# Patient Record
Sex: FEMALE | Race: WHITE | HISPANIC OR LATINO | URBAN - METROPOLITAN AREA
[De-identification: names, ages, dates, MRNs, and addresses within clinical notes are randomized per-mention and may not be internally consistent; named-entity substitution may affect disease eponyms.]

---

## 2023-04-12 ENCOUNTER — WEB ENCOUNTER (OUTPATIENT)
Dept: URBAN - METROPOLITAN AREA CLINIC 7 | Facility: CLINIC | Age: 63
End: 2023-04-12

## 2023-04-18 ENCOUNTER — OFFICE VISIT (OUTPATIENT)
Dept: URBAN - METROPOLITAN AREA CLINIC 7 | Facility: CLINIC | Age: 63
End: 2023-04-18
Payer: COMMERCIAL

## 2023-04-18 ENCOUNTER — WEB ENCOUNTER (OUTPATIENT)
Dept: URBAN - METROPOLITAN AREA CLINIC 7 | Facility: CLINIC | Age: 63
End: 2023-04-18

## 2023-04-18 VITALS
HEIGHT: 60 IN | SYSTOLIC BLOOD PRESSURE: 118 MMHG | WEIGHT: 153 LBS | TEMPERATURE: 97.7 F | BODY MASS INDEX: 30.04 KG/M2 | DIASTOLIC BLOOD PRESSURE: 80 MMHG

## 2023-04-18 DIAGNOSIS — R19.7 DIARRHEA, UNSPECIFIED TYPE: ICD-10-CM

## 2023-04-18 DIAGNOSIS — R14.0 ABDOMINAL BLOATING: ICD-10-CM

## 2023-04-18 PROCEDURE — 99244 OFF/OP CNSLTJ NEW/EST MOD 40: CPT | Performed by: STUDENT IN AN ORGANIZED HEALTH CARE EDUCATION/TRAINING PROGRAM

## 2023-04-18 PROCEDURE — 99204 OFFICE O/P NEW MOD 45 MIN: CPT | Performed by: STUDENT IN AN ORGANIZED HEALTH CARE EDUCATION/TRAINING PROGRAM

## 2023-04-18 RX ORDER — METHYLPREDNISOLONE 4 MG/1
TABLET ORAL
Qty: 21 | Refills: 0 | Status: ON HOLD | COMMUNITY

## 2023-04-18 RX ORDER — LISINOPRIL 5 MG/1
TABLET ORAL
Qty: 135 TABLET | Refills: 0 | Status: ACTIVE | COMMUNITY

## 2023-04-18 RX ORDER — PRASTERONE 6.5 MG/1
UNWRAP AND INSERT ONE SUPPOSITORY VAGINALLY EVERY NIGHT AT BEDTIME INSERT VAGINAL
Qty: 28 EACH | Refills: 0 | Status: ACTIVE | COMMUNITY

## 2023-04-18 RX ORDER — SIMETHICONE 125 MG/1
1 TABLET AFTER MEALS AND AT BEDTIME AS NEEDED TABLET, CHEWABLE ORAL
OUTPATIENT
Start: 2023-04-17

## 2023-04-18 RX ORDER — ALBUTEROL SULFATE 108 UG/1
INHALE 2 PUFFS BY MOUTH FOUR TIMES DAILY AS DIIRECTED AEROSOL, METERED RESPIRATORY (INHALATION)
Qty: 6.7 GRAM | Refills: 0 | Status: ACTIVE | COMMUNITY

## 2023-04-18 RX ORDER — AZITHROMYCIN 250 MG/1
TABLET, FILM COATED ORAL
Qty: 11 TABLET | Refills: 0 | Status: ACTIVE | COMMUNITY

## 2023-04-18 NOTE — HPI-TODAY'S VISIT:
Patient has a history of chronic sinusitis, cholecystectomy in 2012 for biliary colic, HTN, post menopausal sx,  who presents for abd bloating  GI Hx: [place] and has recurrent sinusitis. Has had bloating, distension for a couple of months. Early satiety. No nausea/vomiting. Intermittent constipation (will not go 1-3 days), then diarrhea.  +Incomplete evacuation. 3rd child with vaginal delivery- tear.  Denies weight loss, fever, chills, nausea, vomiting. Denies dysphagia, reflux, UGI symptoms. Denies hematemesis, melena, hematochezia, blood per rectum.  EGD: None  Colonoscopy: 2022- [facility]- no records available.  Imaging/Studies/Procedures:

## 2023-05-13 LAB
(TTG) AB, IGA: 153.3
(TTG) AB, IGG: 7
ABSOLUTE BASOPHILS: 38
ABSOLUTE EOSINOPHILS: 163
ABSOLUTE LYMPHOCYTES: 1507
ABSOLUTE MONOCYTES: 403
ABSOLUTE NEUTROPHILS: 2688
BASOPHILS: 0.8
C-REACTIVE PROTEIN, QUANT: 7.4
EOSINOPHILS: 3.4
GLIADIN (DEAMIDATED) AB (IGA): 35.5
GLIADIN (DEAMIDATED) AB (IGG): 53.8
HEMATOCRIT: 34.9
HEMOGLOBIN: 11.6
IMMUNOGLOBULIN A: 216
LYMPHOCYTES: 31.4
MCH: 30.1
MCHC: 33.2
MCV: 90.6
MONOCYTES: 8.4
MPV: 10.2
NEUTROPHILS: 56
PLATELET COUNT: 295
RDW: 12.6
RED BLOOD CELL COUNT: 3.85
TSH: 1.11
WHITE BLOOD CELL COUNT: 4.8

## 2023-05-16 ENCOUNTER — TELEPHONE ENCOUNTER (OUTPATIENT)
Dept: URBAN - METROPOLITAN AREA CLINIC 7 | Facility: CLINIC | Age: 63
End: 2023-05-16

## 2023-05-16 LAB
CALPROTECTIN, FECAL: 11
CAMPYLOBACTER SPP. AG,EIA: (no result)
CRYPTOSPORIDIUM ANTIGEN,: (no result)
GIARDIA AG, EIA, STOOL: (no result)
HELICOBACTER PYLORI AG, EIA, STOOL: (no result)
OVA AND PARASITES, CONC AND PERM SMEAR: (no result)
PANCREATIC ELASTASE, FECAL: >500
SALMONELLA AND SHIGELLA, CULTURE: (no result)
SHIGA TOXINS, EIA W/RFL TO E.COLI O157 CULTURE: (no result)

## 2023-05-23 PROBLEM — 396331005: Status: ACTIVE | Noted: 2023-05-23

## 2023-05-24 ENCOUNTER — OFFICE VISIT (OUTPATIENT)
Dept: URBAN - METROPOLITAN AREA CLINIC 7 | Facility: CLINIC | Age: 63
End: 2023-05-24
Payer: COMMERCIAL

## 2023-05-24 VITALS
BODY MASS INDEX: 30.04 KG/M2 | TEMPERATURE: 97.7 F | SYSTOLIC BLOOD PRESSURE: 120 MMHG | WEIGHT: 153 LBS | DIASTOLIC BLOOD PRESSURE: 80 MMHG | RESPIRATION RATE: 16 BRPM | HEIGHT: 60 IN

## 2023-05-24 DIAGNOSIS — D64.9 ANEMIA, UNSPECIFIED TYPE: ICD-10-CM

## 2023-05-24 DIAGNOSIS — K90.0 CELIAC DISEASE: ICD-10-CM

## 2023-05-24 DIAGNOSIS — R93.3 ABNORMAL FINDING ON GI TRACT IMAGING: ICD-10-CM

## 2023-05-24 DIAGNOSIS — R14.0 ABDOMINAL BLOATING: ICD-10-CM

## 2023-05-24 DIAGNOSIS — Z13.21 ENCOUNTER FOR VITAMIN DEFICIENCY SCREENING: ICD-10-CM

## 2023-05-24 DIAGNOSIS — R76.8 ELEVATED ANTI-TISSUE TRANSGLUTAMINASE (TTG) IGA LEVEL: ICD-10-CM

## 2023-05-24 DIAGNOSIS — K52.9 COLITIS: ICD-10-CM

## 2023-05-24 PROCEDURE — 99214 OFFICE O/P EST MOD 30 MIN: CPT | Performed by: STUDENT IN AN ORGANIZED HEALTH CARE EDUCATION/TRAINING PROGRAM

## 2023-05-24 RX ORDER — ALBUTEROL SULFATE 108 UG/1
INHALE 2 PUFFS BY MOUTH FOUR TIMES DAILY AS DIIRECTED AEROSOL, METERED RESPIRATORY (INHALATION)
Qty: 6.7 GRAM | Refills: 0 | Status: DISCONTINUED | COMMUNITY

## 2023-05-24 RX ORDER — METHYLPREDNISOLONE 4 MG/1
TABLET ORAL
Qty: 21 | Refills: 0 | Status: DISCONTINUED | COMMUNITY

## 2023-05-24 RX ORDER — AZITHROMYCIN 250 MG/1
TABLET, FILM COATED ORAL
Qty: 11 TABLET | Refills: 0 | Status: DISCONTINUED | COMMUNITY

## 2023-05-24 RX ORDER — LISINOPRIL 5 MG/1
TABLET ORAL
Qty: 135 TABLET | Refills: 0 | Status: ACTIVE | COMMUNITY

## 2023-05-24 RX ORDER — PRASTERONE 6.5 MG/1
UNWRAP AND INSERT ONE SUPPOSITORY VAGINALLY EVERY NIGHT AT BEDTIME INSERT VAGINAL
Qty: 28 EACH | Refills: 0 | Status: ACTIVE | COMMUNITY

## 2023-05-24 RX ORDER — SIMETHICONE 125 MG/1
1 TABLET AFTER MEALS AND AT BEDTIME AS NEEDED TABLET, CHEWABLE ORAL
Status: DISCONTINUED | COMMUNITY
Start: 2023-04-17

## 2023-05-24 NOTE — HPI-TODAY'S VISIT:
Patient has a history of chronic sinusitis, cholecystectomy in 2012 for biliary colic, HTN, post menopausal sx,  celiac disease? who presents for follow up.  GI Hx: 4/2023- [place] and has recurrent sinusitis. Has had bloating, distension for a couple of months. Early satiety. No nausea/vomiting. Intermittent constipation (will not go 1-3 days), then diarrhea. +Incomplete evacuation. 3rd child with vaginal delivery- tear. 5/24/23-  ***  Daughter with celiac disease.  Mild wall thickening and enhancement of asc colon  Denies weight loss, fever, chills, nausea, vomiting. Denies dysphagia, reflux, UGI symptoms. Denies hematemesis, melena, hematochezia, blood per rectum.  EGD: None  Colonoscopy: 2022- [facility]- no records available.  Imaging/Studies/Procedures: CT enterography 5/10/23- cholecystectomy, mild prominence of CBD (0.7cm, likely post cholecystectomy normal. Mild wall thickening and enhancement of asc colon. Mild fatty liver. 5/2023- celiac test positive, Hgb 11.6. TSH/CRP normal. stool culture, O/P, elastase, calprotectin 11,  H pylori, crypto, giardia all normal.

## 2023-06-02 ENCOUNTER — OFFICE VISIT (OUTPATIENT)
Dept: URBAN - METROPOLITAN AREA CLINIC 7 | Facility: CLINIC | Age: 63
End: 2023-06-02

## 2023-06-10 ENCOUNTER — WEB ENCOUNTER (OUTPATIENT)
Dept: URBAN - METROPOLITAN AREA CLINIC 7 | Facility: CLINIC | Age: 63
End: 2023-06-10

## 2023-06-14 ENCOUNTER — CLAIMS CREATED FROM THE CLAIM WINDOW (OUTPATIENT)
Dept: URBAN - METROPOLITAN AREA SURGERY CENTER 5 | Facility: SURGERY CENTER | Age: 63
End: 2023-06-14
Payer: COMMERCIAL

## 2023-06-14 ENCOUNTER — CLAIMS CREATED FROM THE CLAIM WINDOW (OUTPATIENT)
Dept: URBAN - METROPOLITAN AREA CLINIC 4 | Facility: CLINIC | Age: 63
End: 2023-06-14
Payer: COMMERCIAL

## 2023-06-14 DIAGNOSIS — R93.3 ABNORMAL FINDINGS ON DIAGNOSTIC IMAGING OF OTHER PARTS OF DIGESTIVE TRACT: ICD-10-CM

## 2023-06-14 DIAGNOSIS — R93.3 ABNORMAL FINDING ON GI TRACT IMAGING: ICD-10-CM

## 2023-06-14 DIAGNOSIS — K64.0 FIRST DEGREE HEMORRHOIDS: ICD-10-CM

## 2023-06-14 DIAGNOSIS — K57.30 DIVERTICULOSIS OF LARGE INTESTINE WITHOUT PERFORATION OR ABSCESS WITHOUT BLEEDING: ICD-10-CM

## 2023-06-14 DIAGNOSIS — K63.89 OTHER SPECIFIED DISEASES OF INTESTINE: ICD-10-CM

## 2023-06-14 PROCEDURE — 88305 TISSUE EXAM BY PATHOLOGIST: CPT | Performed by: PATHOLOGY

## 2023-06-14 PROCEDURE — 45380 COLONOSCOPY AND BIOPSY: CPT | Performed by: STUDENT IN AN ORGANIZED HEALTH CARE EDUCATION/TRAINING PROGRAM

## 2023-06-14 PROCEDURE — 00811 ANES LWR INTST NDSC NOS: CPT | Performed by: NURSE ANESTHETIST, CERTIFIED REGISTERED

## 2023-06-14 RX ORDER — LISINOPRIL 5 MG/1
TABLET ORAL
Qty: 135 TABLET | Refills: 0 | Status: ACTIVE | COMMUNITY

## 2023-06-14 RX ORDER — PRASTERONE 6.5 MG/1
UNWRAP AND INSERT ONE SUPPOSITORY VAGINALLY EVERY NIGHT AT BEDTIME INSERT VAGINAL
Qty: 28 EACH | Refills: 0 | Status: ACTIVE | COMMUNITY

## 2023-06-15 PROBLEM — 724538004: Status: ACTIVE | Noted: 2023-06-15

## 2023-08-07 ENCOUNTER — WEB ENCOUNTER (OUTPATIENT)
Dept: URBAN - METROPOLITAN AREA CLINIC 7 | Facility: CLINIC | Age: 63
End: 2023-08-07

## 2023-08-09 ENCOUNTER — WEB ENCOUNTER (OUTPATIENT)
Dept: URBAN - METROPOLITAN AREA CLINIC 7 | Facility: CLINIC | Age: 63
End: 2023-08-09

## 2023-08-25 ENCOUNTER — OFFICE VISIT (OUTPATIENT)
Dept: URBAN - METROPOLITAN AREA CLINIC 7 | Facility: CLINIC | Age: 63
End: 2023-08-25

## 2023-10-16 ENCOUNTER — WEB ENCOUNTER (OUTPATIENT)
Dept: URBAN - METROPOLITAN AREA CLINIC 7 | Facility: CLINIC | Age: 63
End: 2023-10-16

## 2023-10-17 ENCOUNTER — TELEPHONE ENCOUNTER (OUTPATIENT)
Dept: URBAN - METROPOLITAN AREA CLINIC 7 | Facility: CLINIC | Age: 63
End: 2023-10-17

## 2023-10-17 ENCOUNTER — WEB ENCOUNTER (OUTPATIENT)
Dept: URBAN - METROPOLITAN AREA CLINIC 7 | Facility: CLINIC | Age: 63
End: 2023-10-17

## 2023-10-17 ENCOUNTER — TELEPHONE ENCOUNTER (OUTPATIENT)
Dept: URBAN - METROPOLITAN AREA CLINIC 9 | Facility: CLINIC | Age: 63
End: 2023-10-17

## 2023-10-19 ENCOUNTER — OFFICE VISIT (OUTPATIENT)
Dept: URBAN - METROPOLITAN AREA CLINIC 9 | Facility: CLINIC | Age: 63
End: 2023-10-19
Payer: COMMERCIAL

## 2023-10-19 ENCOUNTER — DASHBOARD ENCOUNTERS (OUTPATIENT)
Age: 63
End: 2023-10-19

## 2023-10-19 VITALS
BODY MASS INDEX: 28.66 KG/M2 | DIASTOLIC BLOOD PRESSURE: 70 MMHG | HEIGHT: 60 IN | SYSTOLIC BLOOD PRESSURE: 120 MMHG | WEIGHT: 146 LBS

## 2023-10-19 DIAGNOSIS — R14.0 ABDOMINAL BLOATING: ICD-10-CM

## 2023-10-19 DIAGNOSIS — K90.0 CELIAC DISEASE: ICD-10-CM

## 2023-10-19 DIAGNOSIS — R11.2 NAUSEA AND VOMITING, UNSPECIFIED VOMITING TYPE: ICD-10-CM

## 2023-10-19 DIAGNOSIS — R68.81 EARLY SATIETY: ICD-10-CM

## 2023-10-19 PROCEDURE — 99214 OFFICE O/P EST MOD 30 MIN: CPT | Performed by: STUDENT IN AN ORGANIZED HEALTH CARE EDUCATION/TRAINING PROGRAM

## 2023-10-19 RX ORDER — LISINOPRIL 5 MG/1
TABLET ORAL
Qty: 135 TABLET | Refills: 0 | Status: ACTIVE | COMMUNITY

## 2023-10-19 RX ORDER — PRASTERONE 6.5 MG/1
UNWRAP AND INSERT ONE SUPPOSITORY VAGINALLY EVERY NIGHT AT BEDTIME INSERT VAGINAL
Qty: 28 EACH | Refills: 0 | Status: ACTIVE | COMMUNITY

## 2023-10-19 NOTE — HPI-TODAY'S VISIT:
Patient has a history of chronic sinusitis, cholecystectomy in 2012 for biliary colic, HTN, post menopausal sx,  celiac disease, c section x2, who presents for follow up.  GI Hx: 4/2023- [place] and has recurrent sinusitis. Has had bloating, distension for a couple of months. Early satiety. No nausea/vomiting. Intermittent constipation (will not go 1-3 days), then diarrhea. +Incomplete evacuation. 3rd child with vaginal delivery- tear. 10/19/23- Initially small BMs. Went to [place] starting on Friday. Had acute vomiting, abd distension (mild gaseous distension). Epigastric discomfort. Since Friday has improved. No vomiting since friday. Intermittent nausea.  Of note, Daughter with celiac disease.  EGD: None  Colonoscopy: 2022- [facility]- no records available. 6/14/23- Colon and TI normal. Sigmoid divertic. IH. Asc colon bx: normal. Repeat 10 years.  Imaging/Studies/Procedures: CT enterography 5/10/23- cholecystectomy, mild prominence of CBD (0.7cm, likely post cholecystectomy normal. Mild wall thickening and enhancement of asc colon. Mild fatty liver. 5/2023- celiac test positive (TTG and gliadin positive), Hgb 11.6. TSH/CRP normal. stool culture, O/P, elastase, calprotectin 11,  H pylori, crypto, giardia all normal. 10/2/23- CMP, TSH, CBC normal.

## 2023-10-23 ENCOUNTER — CLAIMS CREATED FROM THE CLAIM WINDOW (OUTPATIENT)
Dept: URBAN - METROPOLITAN AREA CLINIC 4 | Facility: CLINIC | Age: 63
End: 2023-10-23
Payer: COMMERCIAL

## 2023-10-23 ENCOUNTER — CLAIMS CREATED FROM THE CLAIM WINDOW (OUTPATIENT)
Dept: URBAN - METROPOLITAN AREA SURGERY CENTER 9 | Facility: SURGERY CENTER | Age: 63
End: 2023-10-23
Payer: COMMERCIAL

## 2023-10-23 DIAGNOSIS — K25.7 CHRONIC GASTRIC ULCER WITHOUT HEMORRHAGE OR PERFORATION: ICD-10-CM

## 2023-10-23 DIAGNOSIS — K22.81 ESOPHAGEAL POLYP: ICD-10-CM

## 2023-10-23 DIAGNOSIS — K31.89 OTHER DISEASES OF STOMACH AND DUODENUM: ICD-10-CM

## 2023-10-23 DIAGNOSIS — K31.7 POLYP OF STOMACH AND DUODENUM: ICD-10-CM

## 2023-10-23 DIAGNOSIS — K29.70 GASTRITIS WITHOUT BLEEDING, UNSPECIFIED CHRONICITY, UNSPECIFIED GASTRITIS TYPE: ICD-10-CM

## 2023-10-23 DIAGNOSIS — K25.7 CHRONIC GASTRIC ULCER: ICD-10-CM

## 2023-10-23 DIAGNOSIS — K25.9 GASTRIC ULCER, UNSPECIFIED CHRONICITY, UNSPECIFIED WHETHER GASTRIC ULCER HEMORRHAGE OR PERFORATION PRESENT: ICD-10-CM

## 2023-10-23 DIAGNOSIS — K29.60 OTHER GASTRITIS WITHOUT BLEEDING: ICD-10-CM

## 2023-10-23 DIAGNOSIS — K21.9 GASTRO-ESOPHAGEAL REFLUX DISEASE WITHOUT ESOPHAGITIS: ICD-10-CM

## 2023-10-23 PROBLEM — 397825006: Status: ACTIVE | Noted: 2023-10-23

## 2023-10-23 PROCEDURE — 88342 IMHCHEM/IMCYTCHM 1ST ANTB: CPT | Performed by: PATHOLOGY

## 2023-10-23 PROCEDURE — 88305 TISSUE EXAM BY PATHOLOGIST: CPT | Performed by: PATHOLOGY

## 2023-10-23 PROCEDURE — 00731 ANES UPR GI NDSC PX NOS: CPT | Performed by: NURSE ANESTHETIST, CERTIFIED REGISTERED

## 2023-10-23 PROCEDURE — 88312 SPECIAL STAINS GROUP 1: CPT | Performed by: PATHOLOGY

## 2023-10-23 PROCEDURE — 43239 EGD BIOPSY SINGLE/MULTIPLE: CPT | Performed by: STUDENT IN AN ORGANIZED HEALTH CARE EDUCATION/TRAINING PROGRAM

## 2023-10-23 PROCEDURE — 88341 IMHCHEM/IMCYTCHM EA ADD ANTB: CPT | Performed by: PATHOLOGY

## 2023-10-23 PROCEDURE — 88313 SPECIAL STAINS GROUP 2: CPT | Performed by: PATHOLOGY

## 2023-10-23 RX ORDER — PRASTERONE 6.5 MG/1
UNWRAP AND INSERT ONE SUPPOSITORY VAGINALLY EVERY NIGHT AT BEDTIME INSERT VAGINAL
Qty: 28 EACH | Refills: 0 | Status: ACTIVE | COMMUNITY

## 2023-10-23 RX ORDER — LISINOPRIL 5 MG/1
TABLET ORAL
Qty: 135 TABLET | Refills: 0 | Status: ACTIVE | COMMUNITY

## 2023-10-23 RX ORDER — OMEPRAZOLE 40 MG/1
1 CAPSULE 30 MINUTES BEFORE A MEAL CAPSULE, DELAYED RELEASE ORAL ONCE A DAY
Qty: 30 | Refills: 3 | OUTPATIENT
Start: 2023-10-23

## 2023-10-23 NOTE — HPI-TODAY'S VISIT:
Patient has a history of chronic sinusitis, cholecystectomy in 2012 for biliary colic, HTN, post menopausal sx,  celiac disease, c section x2, who presents for follow up.  GI Hx: 4/2023- [place] and has recurrent sinusitis. Has had bloating, distension for a couple of months. Early satiety. No nausea/vomiting. Intermittent constipation (will not go 1-3 days), then diarrhea. +Incomplete evacuation. 3rd child with vaginal delivery- tear. 10/19/23- Initially small BMs. Went to [place] starting on Friday. Had acute vomiting, abd distension (mild gaseous distension). Epigastric discomfort. Since Friday has improved. No vomiting since friday. Intermittent nausea.  Of note, Daughter with celiac disease.  EGD: None  Colonoscopy: 2022- [facility]- no records available. 6/14/23- Colon and TI normal. Sigmoid divertic. IH. Asc colon bx: normal. Repeat 10 years.  Imaging/Studies/Procedures: CT enterography 5/10/23- cholecystectomy, mild prominence of CBD (0.7cm, likely post cholecystectomy normal. Mild wall thickening and enhancement of asc colon. Mild fatty liver. 5/2023- celiac test positive (TTG and gliadin positive), Hgb 11.6. TSH/CRP normal. stool culture, O/P, elastase, calprotectin 11,  H pylori, crypto, giardia all normal. 10/2/23- CMP, TSH, CBC normal.

## 2023-10-25 LAB
ALPHA-TOCOPHEROL: 16
BETA-GAMMA-TOCOPHEROL: 1
COPPER, SERUM: 127
FERRITIN, SERUM: 122
FOLATE (FOLIC ACID), SERUM: >24
IRON BIND.CAP.(TIBC): 289
IRON SATURATION: 22
IRON: 64
VITAMIN A: 79
VITAMIN B12: 471
VITAMIN D,25-OH,TOTAL,IA: 87
ZINC, PLASMA OR SERUM: 71

## 2023-10-27 ENCOUNTER — OFFICE VISIT (OUTPATIENT)
Dept: URBAN - METROPOLITAN AREA CLINIC 7 | Facility: CLINIC | Age: 63
End: 2023-10-27

## 2023-11-10 ENCOUNTER — TELEPHONE ENCOUNTER (OUTPATIENT)
Dept: URBAN - METROPOLITAN AREA CLINIC 7 | Facility: CLINIC | Age: 63
End: 2023-11-10

## 2023-11-30 LAB — RESULT:: (no result)

## 2024-01-22 ENCOUNTER — WEB ENCOUNTER (OUTPATIENT)
Dept: URBAN - METROPOLITAN AREA CLINIC 7 | Facility: CLINIC | Age: 64
End: 2024-01-22

## 2024-02-05 ENCOUNTER — EGD (OUTPATIENT)
Dept: URBAN - METROPOLITAN AREA SURGERY CENTER 9 | Facility: SURGERY CENTER | Age: 64
End: 2024-02-05

## 2024-02-05 ENCOUNTER — EGD (OUTPATIENT)
Dept: URBAN - METROPOLITAN AREA SURGERY CENTER 9 | Facility: SURGERY CENTER | Age: 64
End: 2024-02-05
Payer: COMMERCIAL

## 2024-02-05 DIAGNOSIS — Z87.19 PERSONAL HISTORY OF OTHER DISEASES OF THE DIGESTIVE SYSTEM: ICD-10-CM

## 2024-02-05 PROCEDURE — 43235 EGD DIAGNOSTIC BRUSH WASH: CPT | Performed by: STUDENT IN AN ORGANIZED HEALTH CARE EDUCATION/TRAINING PROGRAM

## 2024-02-05 RX ORDER — PRASTERONE 6.5 MG/1
UNWRAP AND INSERT ONE SUPPOSITORY VAGINALLY EVERY NIGHT AT BEDTIME INSERT VAGINAL
Qty: 28 EACH | Refills: 0 | Status: ACTIVE | COMMUNITY

## 2024-02-05 RX ORDER — LISINOPRIL 5 MG/1
TABLET ORAL
Qty: 135 TABLET | Refills: 0 | Status: ACTIVE | COMMUNITY

## 2024-02-05 RX ORDER — OMEPRAZOLE 40 MG/1
1 CAPSULE 30 MINUTES BEFORE A MEAL CAPSULE, DELAYED RELEASE ORAL ONCE A DAY
Qty: 30 | Refills: 3 | Status: ACTIVE | COMMUNITY
Start: 2023-10-23

## 2024-02-05 NOTE — HPI-TODAY'S VISIT:
Patient has a history of chronic sinusitis, cholecystectomy in 2012 for biliary colic, HTN, post menopausal sx,  celiac disease, c section x2, who presents for follow up.  GI Hx: 4/2023- [place] and has recurrent sinusitis. Has had bloating, distension for a couple of months. Early satiety. No nausea/vomiting. Intermittent constipation (will not go 1-3 days), then diarrhea. +Incomplete evacuation. 3rd child with vaginal delivery- tear. 10/19/23- Initially small BMs. Went to [place] starting on Friday. Had acute vomiting, abd distension (mild gaseous distension). Epigastric discomfort. Since Friday has improved. No vomiting since friday. Intermittent nausea.  Of note, Daughter with celiac disease.  EGD: 10/23/23- One small esophageal polyp at 15cm (bx; reflux changes), mild to mod gastritis (bx: chronic acitive gastiritis with changes identical to H pylori, but negative), two small fundic appearing polyps in body (bx: chronic inactiv gastritis, prior treated H pylori), two small gastric ulcers (mary III) (bx; foveolar hyperplasia, ulcer border), normal duodenum (bx: normal).  Colonoscopy: 2022- [facility]- no records available. 6/14/23- Colon and TI normal. Sigmoid divertic. IH. Asc colon bx: normal. Repeat 10 years.  Imaging/Studies/Procedures: CT enterography 5/10/23- cholecystectomy, mild prominence of CBD (0.7cm, likely post cholecystectomy normal. Mild wall thickening and enhancement of asc colon. Mild fatty liver. 5/2023- celiac test positive (TTG and gliadin positive), Hgb 11.6. TSH/CRP normal. stool culture, O/P, elastase, calprotectin 11,  H pylori, crypto, giardia all normal. 10/2/23- CMP, TSH, CBC normal.

## 2024-04-16 ENCOUNTER — APPOINTMENT (RX ONLY)
Dept: URBAN - METROPOLITAN AREA CLINIC 333 | Facility: CLINIC | Age: 64
Setting detail: DERMATOLOGY
End: 2024-04-16

## 2024-04-16 DIAGNOSIS — L57.8 OTHER SKIN CHANGES DUE TO CHRONIC EXPOSURE TO NONIONIZING RADIATION: ICD-10-CM

## 2024-04-16 DIAGNOSIS — L70.0 ACNE VULGARIS: ICD-10-CM

## 2024-04-16 DIAGNOSIS — D22 MELANOCYTIC NEVI: ICD-10-CM

## 2024-04-16 PROBLEM — D22.5 MELANOCYTIC NEVI OF TRUNK: Status: ACTIVE | Noted: 2024-04-16

## 2024-04-16 PROCEDURE — ? TREATMENT REGIMEN

## 2024-04-16 PROCEDURE — 99203 OFFICE O/P NEW LOW 30 MIN: CPT

## 2024-04-16 PROCEDURE — ? COUNSELING

## 2024-04-16 ASSESSMENT — LOCATION SIMPLE DESCRIPTION DERM
LOCATION SIMPLE: CHEST
LOCATION SIMPLE: LEFT FOREARM
LOCATION SIMPLE: RIGHT FOREARM
LOCATION SIMPLE: RIGHT FOREHEAD
LOCATION SIMPLE: UPPER BACK
LOCATION SIMPLE: RIGHT UPPER BACK

## 2024-04-16 ASSESSMENT — LOCATION DETAILED DESCRIPTION DERM
LOCATION DETAILED: UPPER STERNUM
LOCATION DETAILED: LEFT DISTAL DORSAL FOREARM
LOCATION DETAILED: SUPERIOR THORACIC SPINE
LOCATION DETAILED: RIGHT SUPERIOR MEDIAL UPPER BACK
LOCATION DETAILED: RIGHT DISTAL DORSAL FOREARM
LOCATION DETAILED: RIGHT INFERIOR MEDIAL FOREHEAD

## 2024-04-16 ASSESSMENT — LOCATION ZONE DERM
LOCATION ZONE: TRUNK
LOCATION ZONE: FACE
LOCATION ZONE: ARM

## 2024-04-16 NOTE — HPI: EVALUATION OF SKIN LESION(S)
Hpi Title: Evaluation of Skin Lesions
Additional History: Patient has some acne concerns on her face and back  as well

## 2024-04-16 NOTE — PROCEDURE: COUNSELING
Detail Level: Generalized
Winlevi Counseling:  I discussed with the patient the risks of topical clascoterone including but not limited to erythema, scaling, itching, and stinging. Patient voiced their understanding.
Tetracycline Counseling: Patient counseled regarding possible photosensitivity and increased risk for sunburn.  Patient instructed to avoid sunlight, if possible.  When exposed to sunlight, patients should wear protective clothing, sunglasses, and sunscreen.  The patient was instructed to call the office immediately if the following severe adverse effects occur:  hearing changes, easy bruising/bleeding, severe headache, or vision changes.  The patient verbalized understanding of the proper use and possible adverse effects of tetracycline.  All of the patient's questions and concerns were addressed. Patient understands to avoid pregnancy while on therapy due to potential birth defects.
Birth Control Pills Counseling: Birth Control Pill Counseling: I discussed with the patient the potential side effects of OCPs including but not limited to increased risk of stroke, heart attack, thrombophlebitis, deep venous thrombosis, hepatic adenomas, breast changes, GI upset, headaches, and depression.  The patient verbalized understanding of the proper use and possible adverse effects of OCPs. All of the patient's questions and concerns were addressed.
Isotretinoin Pregnancy And Lactation Text: This medication is Pregnancy Category X and is considered extremely dangerous during pregnancy. It is unknown if it is excreted in breast milk.
Bactrim Counseling:  I discussed with the patient the risks of sulfa antibiotics including but not limited to GI upset, allergic reaction, drug rash, diarrhea, dizziness, photosensitivity, and yeast infections.  Rarely, more serious reactions can occur including but not limited to aplastic anemia, agranulocytosis, methemoglobinemia, blood dyscrasias, liver or kidney failure, lung infiltrates or desquamative/blistering drug rashes.
Topical Retinoid Pregnancy And Lactation Text: This medication is Pregnancy Category C. It is unknown if this medication is excreted in breast milk.
Detail Level: Zone
High Dose Vitamin A Pregnancy And Lactation Text: High dose vitamin A therapy is contraindicated during pregnancy and breast feeding.
Doxycycline Pregnancy And Lactation Text: This medication is Pregnancy Category D and not consider safe during pregnancy. It is also excreted in breast milk but is considered safe for shorter treatment courses.
Tazorac Pregnancy And Lactation Text: This medication is not safe during pregnancy. It is unknown if this medication is excreted in breast milk.
Minocycline Pregnancy And Lactation Text: This medication is Pregnancy Category D and not consider safe during pregnancy. It is also excreted in breast milk.
Aklief counseling:  Patient advised to apply a pea-sized amount only at bedtime and wait 30 minutes after washing their face before applying.  If too drying, patient may add a non-comedogenic moisturizer.  The most commonly reported side effects including irritation, redness, scaling, dryness, stinging, burning, itching, and increased risk of sunburn.  The patient verbalized understanding of the proper use and possible adverse effects of retinoids.  All of the patient's questions and concerns were addressed.
Azelaic Acid Counseling: Patient counseled that medicine may cause skin irritation and to avoid applying near the eyes.  In the event of skin irritation, the patient was advised to reduce the amount of the drug applied or use it less frequently.   The patient verbalized understanding of the proper use and possible adverse effects of azelaic acid.  All of the patient's questions and concerns were addressed.
Azithromycin Counseling:  I discussed with the patient the risks of azithromycin including but not limited to GI upset, allergic reaction, drug rash, diarrhea, and yeast infections.
Benzoyl Peroxide Counseling: Patient counseled that medicine may cause skin irritation and bleach clothing.  In the event of skin irritation, the patient was advised to reduce the amount of the drug applied or use it less frequently.   The patient verbalized understanding of the proper use and possible adverse effects of benzoyl peroxide.  All of the patient's questions and concerns were addressed.
Dapsone Pregnancy And Lactation Text: This medication is Pregnancy Category C and is not considered safe during pregnancy or breast feeding.
Topical Clindamycin Pregnancy And Lactation Text: This medication is Pregnancy Category B and is considered safe during pregnancy. It is unknown if it is excreted in breast milk.
Birth Control Pills Pregnancy And Lactation Text: This medication should be avoided if pregnant and for the first 30 days post-partum.
Isotretinoin Counseling: Patient should get monthly blood tests, not donate blood, not drive at night if vision affected, not share medication, and not undergo elective surgery for 6 months after tx completed. Side effects reviewed, pt to contact office should one occur.
Topical Sulfur Applications Pregnancy And Lactation Text: This medication is Pregnancy Category C and has an unknown safety profile during pregnancy. It is unknown if this topical medication is excreted in breast milk.
Include Pregnancy/Lactation Warning?: No
Spironolactone Pregnancy And Lactation Text: This medication can cause feminization of the male fetus and should be avoided during pregnancy. The active metabolite is also found in breast milk.
Bactrim Pregnancy And Lactation Text: This medication is Pregnancy Category D and is known to cause fetal risk.  It is also excreted in breast milk.
Winlevi Pregnancy And Lactation Text: This medication is considered safe during pregnancy and breastfeeding.
High Dose Vitamin A Counseling: Side effects reviewed, pt to contact office should one occur.
Topical Retinoid counseling:  Patient advised to apply a pea-sized amount only at bedtime and wait 30 minutes after washing their face before applying.  If too drying, patient may add a non-comedogenic moisturizer. The patient verbalized understanding of the proper use and possible adverse effects of retinoids.  All of the patient's questions and concerns were addressed.
Azithromycin Pregnancy And Lactation Text: This medication is considered safe during pregnancy and is also secreted in breast milk.
Erythromycin Counseling:  I discussed with the patient the risks of erythromycin including but not limited to GI upset, allergic reaction, drug rash, diarrhea, increase in liver enzymes, and yeast infections.
Minocycline Counseling: Patient advised regarding possible photosensitivity and discoloration of the teeth, skin, lips, tongue and gums.  Patient instructed to avoid sunlight, if possible.  When exposed to sunlight, patients should wear protective clothing, sunglasses, and sunscreen.  The patient was instructed to call the office immediately if the following severe adverse effects occur:  hearing changes, easy bruising/bleeding, severe headache, or vision changes.  The patient verbalized understanding of the proper use and possible adverse effects of minocycline.  All of the patient's questions and concerns were addressed.
Tazorac Counseling:  Patient advised that medication is irritating and drying.  Patient may need to apply sparingly and wash off after an hour before eventually leaving it on overnight.  The patient verbalized understanding of the proper use and possible adverse effects of tazorac.  All of the patient's questions and concerns were addressed.
Topical Clindamycin Counseling: Patient counseled that this medication may cause skin irritation or allergic reactions.  In the event of skin irritation, the patient was advised to reduce the amount of the drug applied or use it less frequently.   The patient verbalized understanding of the proper use and possible adverse effects of clindamycin.  All of the patient's questions and concerns were addressed.
Sarecycline Counseling: Patient advised regarding possible photosensitivity and discoloration of the teeth, skin, lips, tongue and gums.  Patient instructed to avoid sunlight, if possible.  When exposed to sunlight, patients should wear protective clothing, sunglasses, and sunscreen.  The patient was instructed to call the office immediately if the following severe adverse effects occur:  hearing changes, easy bruising/bleeding, severe headache, or vision changes.  The patient verbalized understanding of the proper use and possible adverse effects of sarecycline.  All of the patient's questions and concerns were addressed.
Aklief Pregnancy And Lactation Text: It is unknown if this medication is safe to use during pregnancy.  It is unknown if this medication is excreted in breast milk.  Breastfeeding women should use the topical cream on the smallest area of the skin for the shortest time needed while breastfeeding.  Do not apply to nipple and areola.
Doxycycline Counseling:  Patient counseled regarding possible photosensitivity and increased risk for sunburn.  Patient instructed to avoid sunlight, if possible.  When exposed to sunlight, patients should wear protective clothing, sunglasses, and sunscreen.  The patient was instructed to call the office immediately if the following severe adverse effects occur:  hearing changes, easy bruising/bleeding, severe headache, or vision changes.  The patient verbalized understanding of the proper use and possible adverse effects of doxycycline.  All of the patient's questions and concerns were addressed.
Azelaic Acid Pregnancy And Lactation Text: This medication is considered safe during pregnancy and breast feeding.
Dapsone Counseling: I discussed with the patient the risks of dapsone including but not limited to hemolytic anemia, agranulocytosis, rashes, methemoglobinemia, kidney failure, peripheral neuropathy, headaches, GI upset, and liver toxicity.  Patients who start dapsone require monitoring including baseline LFTs and weekly CBCs for the first month, then every month thereafter.  The patient verbalized understanding of the proper use and possible adverse effects of dapsone.  All of the patient's questions and concerns were addressed.
Topical Sulfur Applications Counseling: Topical Sulfur Counseling: Patient counseled that this medication may cause skin irritation or allergic reactions.  In the event of skin irritation, the patient was advised to reduce the amount of the drug applied or use it less frequently.   The patient verbalized understanding of the proper use and possible adverse effects of topical sulfur application.  All of the patient's questions and concerns were addressed.
Erythromycin Pregnancy And Lactation Text: This medication is Pregnancy Category B and is considered safe during pregnancy. It is also excreted in breast milk.
Benzoyl Peroxide Pregnancy And Lactation Text: This medication is Pregnancy Category C. It is unknown if benzoyl peroxide is excreted in breast milk.
Spironolactone Counseling: Patient advised regarding risks of diarrhea, abdominal pain, hyperkalemia, birth defects (for female patients), liver toxicity and renal toxicity. The patient may need blood work to monitor liver and kidney function and potassium levels while on therapy. The patient verbalized understanding of the proper use and possible adverse effects of spironolactone.  All of the patient's questions and concerns were addressed.

## 2024-04-22 ENCOUNTER — RX ONLY (OUTPATIENT)
Age: 64
Setting detail: RX ONLY
End: 2024-04-22

## 2024-04-22 RX ORDER — CLINDAMYCIN PHOSPHATE 10 MG/ML
AS DIRECTED SOLUTION TOPICAL AS DIRECTED
Qty: 60 | Refills: 2 | Status: ERX | COMMUNITY
Start: 2024-04-22

## 2025-04-16 ENCOUNTER — OFFICE VISIT (OUTPATIENT)
Dept: URBAN - METROPOLITAN AREA CLINIC 9 | Facility: CLINIC | Age: 65
End: 2025-04-16

## 2025-05-05 ENCOUNTER — OFFICE VISIT (OUTPATIENT)
Dept: URBAN - METROPOLITAN AREA CLINIC 9 | Facility: CLINIC | Age: 65
End: 2025-05-05
Payer: COMMERCIAL

## 2025-05-05 DIAGNOSIS — K59.00 ACUTE CONSTIPATION: ICD-10-CM

## 2025-05-05 DIAGNOSIS — K58.1 IRRITABLE BOWEL SYNDROME WITH CONSTIPATION: ICD-10-CM

## 2025-05-05 PROCEDURE — 99214 OFFICE O/P EST MOD 30 MIN: CPT | Performed by: INTERNAL MEDICINE

## 2025-05-05 RX ORDER — OMEPRAZOLE 40 MG/1
TAKE 1 CAPSULE BY MOUTH EVERY DAY 30 MINUTES BEFORE A MEAL CAPSULE, DELAYED RELEASE ORAL
Qty: 90 CAPSULE | Refills: 0 | Status: DISCONTINUED | COMMUNITY

## 2025-05-05 RX ORDER — PRASTERONE 6.5 MG/1
UNWRAP AND INSERT ONE SUPPOSITORY VAGINALLY EVERY NIGHT AT BEDTIME INSERT VAGINAL
Qty: 28 EACH | Refills: 0 | Status: ACTIVE | COMMUNITY

## 2025-05-05 RX ORDER — LISINOPRIL 5 MG/1
1 TABLET TABLET ORAL ONCE A DAY
Qty: 90 TABLET | Refills: 0 | Status: ACTIVE | COMMUNITY

## 2025-05-05 NOTE — HPI-TODAY'S VISIT:
Patient has a history of chronic sinusitis, cholecystectomy in 2012 for biliary colic, HTN, post menopausal sx,  celiac disease, c section x2, who presents for follow up.  GI Hx: 4/2023- [place] and has recurrent sinusitis. Has had bloating, distension for a couple of months. Early satiety. No nausea/vomiting. Intermittent constipation (will not go 1-3 days), then diarrhea. +Incomplete evacuation. 3rd child with vaginal delivery- tear. 10/19/23- Initially small BMs. Went to [place] starting on Friday. Had acute vomiting, abd distension (mild gaseous distension). Epigastric discomfort. Since Friday has improved. No vomiting since friday. Intermittent nausea.  Of note, Daughter with celiac disease.  EGD: 10/23/23- One small esophageal polyp at 15cm (bx; reflux changes), mild to mod gastritis (bx: chronic acitive gastiritis with changes identical to H pylori, but negative), two small fundic appearing polyps in body (bx: chronic inactiv gastritis, prior treated H pylori), two small gastric ulcers (mary III) (bx; foveolar hyperplasia, ulcer border), normal duodenum (bx: normal).  Colonoscopy: 2022- [facility]- no records available. 6/14/23- Colon and TI normal. Sigmoid divertic. IH. Asc colon bx: normal. Repeat 10 years.  Imaging/Studies/Procedures: CT enterography 5/10/23- cholecystectomy, mild prominence of CBD (0.7cm, likely post cholecystectomy normal. Mild wall thickening and enhancement of asc colon. Mild fatty liver. 5/2023- celiac test positive (TTG and gliadin positive), Hgb 11.6. TSH/CRP normal. stool culture, O/P, elastase, calprotectin 11,  H pylori, crypto, giardia all normal. 10/2/23- CMP, TSH, CBC normal.  IH 5/5/2025 64 year old female presents today for ulcers and stomach discomfort she has been feeling like she cannot empty her bowels. she also has an upset stomach when she has to travel and then gets constipated. when she comes back home she is able to have a BM.   prior EGD with Dr Gusman 2/5/2024 normal  prior colon: 6/14/2023 normal-repeat in 10 years